# Patient Record
Sex: MALE | Race: BLACK OR AFRICAN AMERICAN | NOT HISPANIC OR LATINO | URBAN - METROPOLITAN AREA
[De-identification: names, ages, dates, MRNs, and addresses within clinical notes are randomized per-mention and may not be internally consistent; named-entity substitution may affect disease eponyms.]

---

## 2023-05-02 ENCOUNTER — OFFICE VISIT (OUTPATIENT)
Dept: INTERNAL MEDICINE | Facility: CLINIC | Age: 53
End: 2023-05-02
Payer: COMMERCIAL

## 2023-05-02 VITALS
DIASTOLIC BLOOD PRESSURE: 80 MMHG | RESPIRATION RATE: 16 BRPM | HEIGHT: 69 IN | TEMPERATURE: 97.8 F | BODY MASS INDEX: 38.51 KG/M2 | SYSTOLIC BLOOD PRESSURE: 142 MMHG | HEART RATE: 108 BPM | OXYGEN SATURATION: 98 % | WEIGHT: 260 LBS

## 2023-05-02 DIAGNOSIS — I10 HYPERTENSION, UNSPECIFIED TYPE: ICD-10-CM

## 2023-05-02 DIAGNOSIS — M79.89 LEG SWELLING: ICD-10-CM

## 2023-05-02 DIAGNOSIS — E11.9 TYPE 2 DIABETES MELLITUS WITHOUT COMPLICATION, WITHOUT LONG-TERM CURRENT USE OF INSULIN: ICD-10-CM

## 2023-05-02 DIAGNOSIS — R35.0 FREQUENCY OF URINATION: ICD-10-CM

## 2023-05-02 DIAGNOSIS — Z76.89 ENCOUNTER TO ESTABLISH CARE: Primary | ICD-10-CM

## 2023-05-02 LAB
BILIRUB BLD-MCNC: NEGATIVE MG/DL
CLARITY, POC: ABNORMAL
COLOR UR: YELLOW
EXPIRATION DATE: ABNORMAL
GLUCOSE UR STRIP-MCNC: NEGATIVE MG/DL
KETONES UR QL: NEGATIVE
LEUKOCYTE EST, POC: NEGATIVE
Lab: ABNORMAL
NITRITE UR-MCNC: NEGATIVE MG/ML
PH UR: 6 [PH] (ref 5–8)
PROT UR STRIP-MCNC: NEGATIVE MG/DL
RBC # UR STRIP: NEGATIVE /UL
SP GR UR: 1.02 (ref 1–1.03)
UROBILINOGEN UR QL: ABNORMAL

## 2023-05-02 RX ORDER — EMPAGLIFLOZIN 25 MG/1
TABLET, FILM COATED ORAL
COMMUNITY
Start: 2023-04-25

## 2023-05-02 RX ORDER — POTASSIUM CHLORIDE 750 MG/1
10 TABLET, EXTENDED RELEASE ORAL 2 TIMES DAILY
COMMUNITY

## 2023-05-02 RX ORDER — NAPROXEN 500 MG/1
TABLET ORAL
COMMUNITY
Start: 2023-04-26

## 2023-05-02 RX ORDER — CETIRIZINE HYDROCHLORIDE 10 MG/1
10 TABLET ORAL DAILY
COMMUNITY

## 2023-05-02 RX ORDER — FUROSEMIDE 20 MG/1
20 TABLET ORAL DAILY
COMMUNITY

## 2023-05-02 RX ORDER — GLIPIZIDE 10 MG/1
TABLET ORAL
COMMUNITY
Start: 2023-04-27

## 2023-05-02 RX ORDER — PREGABALIN 150 MG/1
CAPSULE ORAL
COMMUNITY
Start: 2023-04-24

## 2023-05-02 RX ORDER — MULTIPLE VITAMINS W/ MINERALS TAB 9MG-400MCG
1 TAB ORAL DAILY
COMMUNITY

## 2023-05-02 RX ORDER — ROSUVASTATIN CALCIUM 40 MG/1
40 TABLET, COATED ORAL DAILY
COMMUNITY

## 2023-05-02 NOTE — PROGRESS NOTES
Office Note     Name: Lincoln Alexander    : 1970     MRN: [id number]     Chief Complaint  Establish Care, Foot Swelling, and Diabetes    Subjective     History of Present Illness:  Lincoln Alexander is a 53 y.o. male who presents today to establish care with a new provider.  Medical history and current home medications reviewed with the patient.  Patient did see Dr. Ravi back in .  [facility].  [facility] ago.  He does have bilateral lower extremity edema.  [facility] and told to stay off of his feet.  He does try to wear compression socks when he is able.  He does have an e-stim which she uses about 20 minutes/day to his lower extremities.  He reports the edema has been worse over the past 2 weeks.  He was diagnosed with diabetes in .  His A1c was 7.0 a few weeks ago.  He is currently on 3 oral agents for diabetic control.  He was diagnosed with hypertension in  as well.  He also has sleep apnea and is [facility].  He has been diagnosed with peripheral neuropathy associated with his diabetes.  He was a previous cigarette smoker.  He uses alcohol rarely.  He denies any recreational drug use.  He denies any known CHF.  He did note that the swelling increased when his Lyrica was increased from 100 mg to 150 mg.  He denies further complaints or concerns at this time.  He presents today to establish care with a new provider and to discuss the above complaints.    Review of Systems   Constitutional: Negative.    HENT: Negative.    Respiratory: Negative.    Cardiovascular: Positive for leg swelling.   Gastrointestinal: Negative.    Genitourinary: Negative.    Musculoskeletal: Negative.    Skin: Negative.    Neurological: Negative.    Psychiatric/Behavioral: Negative.        Past Medical History:   Diagnosis Date   • Chronic back pain    • Diabetes mellitus    • GERD  (gastroesophageal reflux disease)    • Hyperlipidemia        Past Surgical History:   Procedure Laterality Date   • NO PAST SURGERIES     • NO PAST SURGERIES         Social History     Socioeconomic History   • Marital status:    Tobacco Use   • Smoking status: Former     Years: 23.00     Types: Cigarettes     Quit date: 2010     Years since quittin.2   Substance and Sexual Activity   • Alcohol use: Yes     Comment: 2 X YR   • Drug use: No   • Sexual activity: Yes     Partners: Female         Current Outpatient Medications:   •  cetirizine (zyrTEC) 10 MG tablet, Take 1 tablet by mouth Daily., Disp: , Rfl:   •  cholecalciferol (VITAMIN D3) 1000 UNITS tablet, Take 1 tablet by mouth Daily., Disp: , Rfl:   •  fluticasone (FLONASE) 50 MCG/ACT nasal spray, , Disp: , Rfl:   •  furosemide (LASIX) 20 MG tablet, Take 1 tablet by mouth Daily., Disp: , Rfl:   •  glipizide (GLUCOTROL) 10 MG tablet, , Disp: , Rfl:   •  Jardiance 25 MG tablet tablet, , Disp: , Rfl:   •  Losartan Potassium-HCTZ (HYZAAR PO), losartan-hydrochlorothiazide, Disp: , Rfl:   •  metFORMIN (GLUCOPHAGE) 1000 MG tablet, Take 1 tablet by mouth 2 (Two) Times a Day With Meals., Disp: 60 tablet, Rfl: 3  •  minocycline (MINOCIN,DYNACIN) 100 MG capsule, Take 1 capsule by mouth 2 (Two) Times a Day., Disp: , Rfl:   •  multivitamin with minerals tablet tablet, Take 1 tablet by mouth Daily., Disp: , Rfl:   •  naproxen (NAPROSYN) 500 MG tablet, , Disp: , Rfl:   •  omeprazole (PriLOSEC) 20 MG capsule, Take 1 capsule by mouth Daily., Disp: , Rfl:   •  potassium chloride (K-DUR,KLOR-CON) 10 MEQ CR tablet, Take 1 tablet by mouth 2 (Two) Times a Day., Disp: , Rfl:   •  pregabalin (LYRICA) 150 MG capsule, , Disp: , Rfl:   •  rosuvastatin (CRESTOR) 40 MG tablet, Take 1 tablet by mouth Daily., Disp: , Rfl:   •  sildenafil (VIAGRA) 100 MG tablet, Take 1 tablet by mouth Daily As Needed for Erectile Dysfunction., Disp: , Rfl:   •  terazosin (HYTRIN) 5 MG capsule,  "Take 1 capsule by mouth Every Night., Disp: , Rfl:   •  traMADol (ULTRAM) 50 MG tablet, , Disp: , Rfl:     Objective     Vital Signs  /80   Pulse 108   Temp 97.8 °F (36.6 °C)   Resp 16   Ht 175.3 cm (69\")   Wt 118 kg (260 lb)   SpO2 98%   BMI 38.40 kg/m²   Estimated body mass index is 38.4 kg/m² as calculated from the following:    Height as of this encounter: 175.3 cm (69\").    Weight as of this encounter: 118 kg (260 lb).    Class 2 Severe Obesity (BMI >=35 and <=39.9). Obesity-related health conditions include the following: Not addressed at this visit. Obesity is Not addressed at this visit. BMI is Not addressed at this visit. We discussed Not addressed at this visit.      Physical Exam  Constitutional:       Appearance: Normal appearance.   HENT:      Head: Normocephalic and atraumatic.      Nose: Nose normal.   Eyes:      Extraocular Movements: Extraocular movements intact.      Conjunctiva/sclera: Conjunctivae normal.      Pupils: Pupils are equal, round, and reactive to light.   Cardiovascular:      Rate and Rhythm: Normal rate and regular rhythm.   Pulmonary:      Effort: Pulmonary effort is normal. No respiratory distress.      Breath sounds: Normal breath sounds.   Musculoskeletal:         General: Normal range of motion.      Cervical back: Normal range of motion and neck supple.   Skin:     General: Skin is warm and dry.   Neurological:      General: No focal deficit present.      Mental Status: He is alert and oriented to person, place, and time. Mental status is at baseline.   Psychiatric:         Mood and Affect: Mood normal.         Behavior: Behavior normal.         Thought Content: Thought content normal.         Judgment: Judgment normal.          Assessment and Plan     Diagnoses and all orders for this visit:    1. Encounter to establish care (Primary)    2. Frequency of urination  -     POCT urinalysis dipstick, automated    3. Leg swelling    4. Hypertension, unspecified " type    5. Type 2 diabetes mellitus without complication, without long-term current use of insulin    Plan:  Patient presents to establish care with a new provider.  [facility]  We will try to obtain these lab records.  UA in the office today bland, no proteinuria.  Low-sodium diet advised.  Wear compression stockings as able.  Elevate feet as able.  Return to clinic in 2 weeks for follow-up visit.  Return to clinic sooner if needed.  Go to ED for further evaluation if any symptom worsens.    Follow Up  Return in about 2 weeks (around 5/16/2023).    FROYLAN Ruano    Part of this note may be an electronic transcription/translation of spoken language to printed text using the Dragon Dictation System.

## 2023-05-17 ENCOUNTER — OFFICE VISIT (OUTPATIENT)
Dept: INTERNAL MEDICINE | Facility: CLINIC | Age: 53
End: 2023-05-17
Payer: COMMERCIAL

## 2023-05-17 VITALS
SYSTOLIC BLOOD PRESSURE: 142 MMHG | OXYGEN SATURATION: 99 % | TEMPERATURE: 97.8 F | WEIGHT: 263 LBS | HEIGHT: 69 IN | DIASTOLIC BLOOD PRESSURE: 84 MMHG | BODY MASS INDEX: 38.95 KG/M2 | HEART RATE: 70 BPM

## 2023-05-17 DIAGNOSIS — Z09 FOLLOW-UP EXAM: Primary | ICD-10-CM

## 2023-05-17 DIAGNOSIS — K21.9 GASTROESOPHAGEAL REFLUX DISEASE, UNSPECIFIED WHETHER ESOPHAGITIS PRESENT: ICD-10-CM

## 2023-05-17 DIAGNOSIS — E78.5 HYPERLIPIDEMIA, UNSPECIFIED HYPERLIPIDEMIA TYPE: ICD-10-CM

## 2023-05-17 DIAGNOSIS — R60.0 BILATERAL LEG EDEMA: ICD-10-CM

## 2023-05-17 DIAGNOSIS — I10 HYPERTENSION, UNSPECIFIED TYPE: ICD-10-CM

## 2023-05-17 DIAGNOSIS — E11.9 TYPE 2 DIABETES MELLITUS WITHOUT COMPLICATION, WITHOUT LONG-TERM CURRENT USE OF INSULIN: ICD-10-CM

## 2023-05-17 LAB
EXPIRATION DATE: NORMAL
HBA1C MFR BLD: 8.3 %
Lab: NORMAL

## 2023-05-17 RX ORDER — BUMETANIDE 1 MG/1
TABLET ORAL
Qty: 108 TABLET | OUTPATIENT
Start: 2023-05-17

## 2023-05-17 RX ORDER — BUMETANIDE 1 MG/1
1 TABLET ORAL DAILY
Qty: 35 TABLET | Refills: 1 | Status: SHIPPED | OUTPATIENT
Start: 2023-05-17

## 2023-05-17 NOTE — PROGRESS NOTES
Office Note     Name: Lincoln Alexander    : 1970     MRN: [id number]     Chief Complaint  Diabetes, Foot Swelling (Feet still swelling. Has been taking the medication as prescribed. Would discuss trying a new medication. ), and Foot Pain (The swelling is also causing pain at the bottoms of his feet and tips of toes. )    Subjective     History of Present Illness:  Lincoln Alexander is a 53 y.o. male who presents today for a follow-up visit on diabetes and lower extremity swelling.  Patient complains of ongoing swelling to bilateral lower extremities.  He had been taking Lasix 20 mg daily.  That dose was increased to twice a day for the past 2 weeks with no improvement in lower extremity edema.  No known history of heart failure.  [facility] to [facility] pain.  He also has peripheral neuropathy.  He does complain of pain to the pads of his feet down to his toes.  He does have intermittent sharp pain in his feet but a continuous numbness and pain that occasionally feels warm to touch.  Patient's previous hemoglobin A1c was 7%.  Blood pressure is borderline controlled in the office today.  Patient does have a stim machine that he has been using to assist with lower extremity circulation.  He denies any new complaints or concerns today.      Past Medical History:   Diagnosis Date   • Chronic back pain    • Diabetes mellitus    • GERD (gastroesophageal reflux disease)    • Hyperlipidemia        Past Surgical History:   Procedure Laterality Date   • NO PAST SURGERIES     • NO PAST SURGERIES         Social History     Socioeconomic History   • Marital status:    Tobacco Use   • Smoking status: Former     Packs/day: 0.00     Years: 23.00     Pack years: 0.00     Types: Cigarettes     Quit date: 2010     Years since quittin.2   • Smokeless tobacco: Never   Substance and Sexual Activity   • Alcohol use: Yes     Comment: 2 X YR   • Drug use: No   • Sexual  "activity: Yes     Partners: Female         Current Outpatient Medications:   •  cetirizine (zyrTEC) 10 MG tablet, Take 1 tablet by mouth Daily., Disp: , Rfl:   •  cholecalciferol (VITAMIN D3) 1000 UNITS tablet, Take 1 tablet by mouth Daily., Disp: , Rfl:   •  fluticasone (FLONASE) 50 MCG/ACT nasal spray, , Disp: , Rfl:   •  glipizide (GLUCOTROL) 10 MG tablet, , Disp: , Rfl:   •  Jardiance 25 MG tablet tablet, , Disp: , Rfl:   •  Losartan Potassium-HCTZ (HYZAAR PO), losartan-hydrochlorothiazide, Disp: , Rfl:   •  metFORMIN (GLUCOPHAGE) 1000 MG tablet, Take 1 tablet by mouth 2 (Two) Times a Day With Meals., Disp: 60 tablet, Rfl: 3  •  minocycline (MINOCIN,DYNACIN) 100 MG capsule, Take 1 capsule by mouth 2 (Two) Times a Day., Disp: , Rfl:   •  multivitamin with minerals tablet tablet, Take 1 tablet by mouth Daily., Disp: , Rfl:   •  naproxen (NAPROSYN) 500 MG tablet, , Disp: , Rfl:   •  omeprazole (PriLOSEC) 20 MG capsule, Take 1 capsule by mouth Daily., Disp: , Rfl:   •  potassium chloride (K-DUR,KLOR-CON) 10 MEQ CR tablet, Take 1 tablet by mouth 2 (Two) Times a Day., Disp: , Rfl:   •  pregabalin (LYRICA) 150 MG capsule, , Disp: , Rfl:   •  rosuvastatin (CRESTOR) 40 MG tablet, Take 1 tablet by mouth Daily., Disp: , Rfl:   •  sildenafil (VIAGRA) 100 MG tablet, Take 1 tablet by mouth Daily As Needed for Erectile Dysfunction., Disp: , Rfl:   •  terazosin (HYTRIN) 5 MG capsule, Take 1 capsule by mouth Every Night., Disp: , Rfl:   •  traMADol (ULTRAM) 50 MG tablet, , Disp: , Rfl:   •  bumetanide (Bumex) 1 MG tablet, Take 1 tablet by mouth Daily. Take 1mg twice daily for 3 days then reduce back to 1mg daily., Disp: 35 tablet, Rfl: 1    Objective     Vital Signs  /84   Pulse 70   Temp 97.8 °F (36.6 °C)   Ht 175.3 cm (69.02\")   Wt 119 kg (263 lb)   SpO2 99%   BMI 38.82 kg/m²   Estimated body mass index is 38.82 kg/m² as calculated from the following:    Height as of this encounter: 175.3 cm (69.02\").    Weight as " of this encounter: 119 kg (263 lb).    Class 2 Severe Obesity (BMI >=35 and <=39.9). Obesity-related health conditions include the following: hypertension, diabetes mellitus, dyslipidemias, GERD and osteoarthritis. Obesity is unchanged. BMI is is above average; BMI management plan is completed. We discussed portion control and increasing exercise.      Physical Exam  Constitutional:       Appearance: Normal appearance.   HENT:      Head: Normocephalic and atraumatic.      Nose: Nose normal.   Eyes:      Extraocular Movements: Extraocular movements intact.      Conjunctiva/sclera: Conjunctivae normal.      Pupils: Pupils are equal, round, and reactive to light.      Comments: Glasses in place   Cardiovascular:      Rate and Rhythm: Normal rate.   Pulmonary:      Effort: Pulmonary effort is normal. No respiratory distress.   Musculoskeletal:         General: Normal range of motion.      Cervical back: Normal range of motion and neck supple.      Right lower leg: Edema present.      Left lower leg: Edema present.   Skin:     General: Skin is warm and dry.   Neurological:      General: No focal deficit present.      Mental Status: He is alert and oriented to person, place, and time. Mental status is at baseline.   Psychiatric:         Mood and Affect: Mood normal.         Behavior: Behavior normal.         Thought Content: Thought content normal.         Judgment: Judgment normal.          Assessment and Plan     Diagnoses and all orders for this visit:    1. Follow-up exam (Primary)    2. Type 2 diabetes mellitus without complication, without long-term current use of insulin  -     POC Glycosylated Hemoglobin (Hb A1C)    3. Bilateral leg edema  -     Basic Metabolic Panel; Future  -     Urinalysis With Microscopic - Urine, Clean Catch; Future  -     bumetanide (Bumex) 1 MG tablet; Take 1 tablet by mouth Daily. Take 1mg twice daily for 3 days then reduce back to 1mg daily.  Dispense: 35 tablet; Refill: 1    4.  Hypertension, unspecified type    5. Gastroesophageal reflux disease, unspecified whether esophagitis present    6. Hyperlipidemia, unspecified hyperlipidemia type    Plan:  Hemoglobin A1c elevated in the office today at 8.3%.  Advised patient to work on better glycemic control.  A1c goal less than 7%.  Stop Lasix.  Will start Bumex 1 mg daily.  We will have patient take 1 mg twice daily for 3 days and then reduce to once a day.  Continue to work on low-sodium diet.  Will check BMP and UA in 2 weeks.  Further plan of care based on lab results and patient response to Bumex.  Will consider doing an echo to evaluate for heart failure.  Return to clinic in 1 month.  Return to clinic sooner if needed.  Go to ED for further evaluation if any symptom worsens.    Follow Up  Return in about 1 month (around 6/17/2023).    FROYLAN Ruano    Part of this note may be an electronic transcription/translation of spoken language to printed text using the Dragon Dictation System.

## 2023-07-11 ENCOUNTER — OFFICE VISIT (OUTPATIENT)
Dept: INTERNAL MEDICINE | Facility: CLINIC | Age: 53
End: 2023-07-11
Payer: COMMERCIAL

## 2023-07-11 VITALS
SYSTOLIC BLOOD PRESSURE: 124 MMHG | HEART RATE: 68 BPM | TEMPERATURE: 96.5 F | DIASTOLIC BLOOD PRESSURE: 82 MMHG | WEIGHT: 252 LBS | OXYGEN SATURATION: 99 % | HEIGHT: 69 IN | BODY MASS INDEX: 37.33 KG/M2

## 2023-07-11 DIAGNOSIS — R06.09 DOE (DYSPNEA ON EXERTION): ICD-10-CM

## 2023-07-11 DIAGNOSIS — Z09 FOLLOW-UP EXAM: Primary | ICD-10-CM

## 2023-07-11 DIAGNOSIS — I10 HYPERTENSION, UNSPECIFIED TYPE: ICD-10-CM

## 2023-07-11 DIAGNOSIS — E11.9 TYPE 2 DIABETES MELLITUS WITHOUT COMPLICATION, WITHOUT LONG-TERM CURRENT USE OF INSULIN: ICD-10-CM

## 2023-07-11 DIAGNOSIS — R42 LIGHTHEADEDNESS: ICD-10-CM

## 2023-07-11 DIAGNOSIS — R79.89 ELEVATED SERUM CREATININE: ICD-10-CM

## 2023-07-11 DIAGNOSIS — R60.0 BILATERAL LEG EDEMA: ICD-10-CM

## 2023-07-11 PROCEDURE — 36415 COLL VENOUS BLD VENIPUNCTURE: CPT | Performed by: NURSE PRACTITIONER

## 2023-07-12 LAB
ALBUMIN SERPL-MCNC: 4.4 G/DL (ref 3.5–5.2)
ALBUMIN/GLOB SERPL: 1.6 G/DL
ALP SERPL-CCNC: 54 U/L (ref 39–117)
ALT SERPL W P-5'-P-CCNC: 32 U/L (ref 1–41)
ANION GAP SERPL CALCULATED.3IONS-SCNC: 14 MMOL/L (ref 5–15)
AST SERPL-CCNC: 27 U/L (ref 1–40)
BILIRUB SERPL-MCNC: 0.7 MG/DL (ref 0–1.2)
BUN SERPL-MCNC: 24 MG/DL (ref 6–20)
BUN/CREAT SERPL: 18.5 (ref 7–25)
CALCIUM SPEC-SCNC: 11 MG/DL (ref 8.6–10.5)
CHLORIDE SERPL-SCNC: 92 MMOL/L (ref 98–107)
CO2 SERPL-SCNC: 31 MMOL/L (ref 22–29)
CREAT SERPL-MCNC: 1.3 MG/DL (ref 0.76–1.27)
EGFRCR SERPLBLD CKD-EPI 2021: 65.7 ML/MIN/1.73
GLOBULIN UR ELPH-MCNC: 2.8 GM/DL
GLUCOSE SERPL-MCNC: 217 MG/DL (ref 65–99)
POTASSIUM SERPL-SCNC: 4 MMOL/L (ref 3.5–5.2)
PROT SERPL-MCNC: 7.2 G/DL (ref 6–8.5)
SODIUM SERPL-SCNC: 137 MMOL/L (ref 136–145)

## 2023-07-21 ENCOUNTER — TELEPHONE (OUTPATIENT)
Dept: INTERNAL MEDICINE | Facility: CLINIC | Age: 53
End: 2023-07-21

## 2023-07-21 NOTE — TELEPHONE ENCOUNTER
Caller: Lincoln Alexander    Relationship: Self    Best call back number: [phone]     What medications are you currently taking:   Current Outpatient Medications on File Prior to Visit   Medication Sig Dispense Refill    bumetanide (Bumex) 1 MG tablet Take 1 tablet by mouth Daily. Take 1mg twice daily for 3 days then reduce back to 1mg daily. 30 tablet 1    cetirizine (zyrTEC) 10 MG tablet Take 1 tablet by mouth Daily.      cholecalciferol (VITAMIN D3) 1000 UNITS tablet Take 1 tablet by mouth Daily.      fluticasone (FLONASE) 50 MCG/ACT nasal spray       glipizide (GLUCOTROL) 10 MG tablet       Jardiance 25 MG tablet tablet       Losartan Potassium-HCTZ (HYZAAR PO) losartan-hydrochlorothiazide      metFORMIN (GLUCOPHAGE) 1000 MG tablet Take 1 tablet by mouth 2 (Two) Times a Day With Meals. 60 tablet 3    minocycline (MINOCIN,DYNACIN) 100 MG capsule Take 1 capsule by mouth 2 (Two) Times a Day.      multivitamin with minerals tablet tablet Take 1 tablet by mouth Daily.      naproxen (NAPROSYN) 500 MG tablet       omeprazole (PriLOSEC) 20 MG capsule Take 1 capsule by mouth Daily.      potassium chloride (K-DUR,KLOR-CON) 10 MEQ CR tablet Take 1 tablet by mouth 2 (Two) Times a Day.      pregabalin (LYRICA) 150 MG capsule       rosuvastatin (CRESTOR) 40 MG tablet Take 1 tablet by mouth Daily.      sildenafil (VIAGRA) 100 MG tablet Take 1 tablet by mouth Daily As Needed for Erectile Dysfunction.      terazosin (HYTRIN) 5 MG capsule Take 1 capsule by mouth Every Night.      traMADol (ULTRAM) 50 MG tablet        No current facility-administered medications on file prior to visit.        When did you start taking these medications: HAS BEEN ON THIS MEDICATION FOR A WHILE    Which medication are you concerned about: BUMETANIDE (BUMEX) 1MG TABLET    Who prescribed you this medication: CORTEZ MELÉNDEZ    What are your concerns: PATIENT STATED THAT THERE WERE NO ISSUES WITH THIS MEDICATION UNTIL RECENTLY.     HE STATED IN THE LAST  WEEK HIS RIGHT LEG HAS BEEN EXTREMELY ITCHY. HE HAS TRIED ANTI ITCH CREAM TO NO AVAIL AND READ THAT ONE OF THE SIDE EFFECTS OF THIS MEDICATION IS ITCHING. THIS ITCHINESS IS STARTING TO CAUSE ISSUES WITH SLEEPING.    HE IS REQUESTING TO CEASE THIS MEDICATION.    PLEASE ADVISE

## 2023-07-25 PROBLEM — R79.89 ELEVATED SERUM CREATININE: Status: ACTIVE | Noted: 2023-07-25

## 2023-07-25 PROBLEM — R06.09 DOE (DYSPNEA ON EXERTION): Status: ACTIVE | Noted: 2023-07-25

## 2023-07-25 PROBLEM — R42 LIGHTHEADEDNESS: Status: ACTIVE | Noted: 2023-07-25

## 2023-07-25 NOTE — TELEPHONE ENCOUNTER
Patient would like to try something different to help with the leg swelling. He has tried Lasix and Bumex. Please advise

## 2023-07-26 DIAGNOSIS — R60.0 BILATERAL LEG EDEMA: Primary | ICD-10-CM

## 2023-07-26 RX ORDER — CHLORTHALIDONE 25 MG/1
25 TABLET ORAL DAILY PRN
Qty: 30 TABLET | Refills: 1 | Status: SHIPPED | OUTPATIENT
Start: 2023-07-26

## 2023-07-26 NOTE — PROGRESS NOTES
Office Note     Name: Lincoln Alexander    : 1970     MRN: [id number]     Chief Complaint  Lab Follow Up, Foot Swelling (Does not think the bumex is working. Still swelling on top of his feet. Has previously tried lasix also did not help. Going on vacation next month will be on a 12 hour flight worried about swelling while on the trip), and Dizziness (Still feeling light headed. In between meals and if he stands up too fast. )    Subjective     History of Present Illness:  Lincoln Alexander is a 53 y.o. male who presents today for for a follow-up visit.  Patient continues to experience lower extremity edema despite daily Bumex use.  Patient is also here to follow-up on type 2 diabetes and hypertension.  Most recent hemoglobin A1c is worse at 10.8%.  A1c was 8.3% 2 months ago.  Patient is not strict with his diabetic diet.  Is on glipizide 10 mg daily, Jardiance 25 mg daily, and metformin 1000 mg twice daily.  Patient has been eating a lot of fruit recently.  He reports a high intake of bananas, cherries, apple, and fruit cups.  He is eating the prepackaged fruit cups and drinking the juice.  He is also drinking lots of apple and orange juice daily.  He also admits to eating a lot of Post[place] recently.  Blood pressures well controlled in the office today.  Patient continues to complain of lower extremity edema.  Patient has been taking Bumex 1 mg daily.  Patient has not seen any significant improvement in lower extremity edema.  Patient has not been elevating his legs much recently.  He does help to take care of his father-in-law who lives with him.  He also has been diagnosed with peripheral neuropathy.  He has not been exercising recently.  Diabetes remains uncontrolled.  He is concerned about the lower extremity edema as he is taking his family on a vacation in a few weeks and will be on a road trip in the car for an extended amount of time.  He also wants to know that there are intermittent mild episodes of  dizziness most often associated with position changes.  Denies any syncopal episodes.  Denies any chest pain or shortness of breath.  Denies further complaints or concerns today.      Past Medical History:   Diagnosis Date    Chronic back pain     Diabetes mellitus     GERD (gastroesophageal reflux disease)     Hyperlipidemia        Past Surgical History:   Procedure Laterality Date    NO PAST SURGERIES      NO PAST SURGERIES         Social History     Socioeconomic History    Marital status:    Tobacco Use    Smoking status: Former     Packs/day: 0.00     Years: 23.00     Pack years: 0.00     Types: Cigarettes     Quit date: 2010     Years since quittin.4    Smokeless tobacco: Never   Substance and Sexual Activity    Alcohol use: Yes     Comment: 2 X YR    Drug use: No    Sexual activity: Yes     Partners: Female         Current Outpatient Medications:     bumetanide (Bumex) 1 MG tablet, Take 1 tablet by mouth Daily. Take 1mg twice daily for 3 days then reduce back to 1mg daily., Disp: 30 tablet, Rfl: 1    cetirizine (zyrTEC) 10 MG tablet, Take 1 tablet by mouth Daily., Disp: , Rfl:     cholecalciferol (VITAMIN D3) 1000 UNITS tablet, Take 1 tablet by mouth Daily., Disp: , Rfl:     fluticasone (FLONASE) 50 MCG/ACT nasal spray, , Disp: , Rfl:     glipizide (GLUCOTROL) 10 MG tablet, , Disp: , Rfl:     Jardiance 25 MG tablet tablet, , Disp: , Rfl:     Losartan Potassium-HCTZ (HYZAAR PO), losartan-hydrochlorothiazide, Disp: , Rfl:     metFORMIN (GLUCOPHAGE) 1000 MG tablet, Take 1 tablet by mouth 2 (Two) Times a Day With Meals., Disp: 60 tablet, Rfl: 3    minocycline (MINOCIN,DYNACIN) 100 MG capsule, Take 1 capsule by mouth 2 (Two) Times a Day., Disp: , Rfl:     multivitamin with minerals tablet tablet, Take 1 tablet by mouth Daily., Disp: , Rfl:     naproxen (NAPROSYN) 500 MG tablet, , Disp: , Rfl:     omeprazole (PriLOSEC) 20 MG capsule, Take 1 capsule by mouth Daily., Disp: , Rfl:     potassium  "chloride (K-DUR,KLOR-CON) 10 MEQ CR tablet, Take 1 tablet by mouth 2 (Two) Times a Day., Disp: , Rfl:     pregabalin (LYRICA) 150 MG capsule, , Disp: , Rfl:     rosuvastatin (CRESTOR) 40 MG tablet, Take 1 tablet by mouth Daily., Disp: , Rfl:     sildenafil (VIAGRA) 100 MG tablet, Take 1 tablet by mouth Daily As Needed for Erectile Dysfunction., Disp: , Rfl:     terazosin (HYTRIN) 5 MG capsule, Take 1 capsule by mouth Every Night., Disp: , Rfl:     traMADol (ULTRAM) 50 MG tablet, , Disp: , Rfl:     Objective     Vital Signs  /82   Pulse 68   Temp 96.5 °F (35.8 °C)   Ht 175.3 cm (69.02\")   Wt 114 kg (252 lb)   SpO2 99%   BMI 37.20 kg/m²   Estimated body mass index is 37.2 kg/m² as calculated from the following:    Height as of this encounter: 175.3 cm (69.02\").    Weight as of this encounter: 114 kg (252 lb).           Physical Exam  Constitutional:       Appearance: Normal appearance.   HENT:      Head: Normocephalic and atraumatic.      Nose: Nose normal.   Eyes:      Extraocular Movements: Extraocular movements intact.      Conjunctiva/sclera: Conjunctivae normal.      Pupils: Pupils are equal, round, and reactive to light.   Cardiovascular:      Rate and Rhythm: Normal rate and regular rhythm.   Pulmonary:      Effort: Pulmonary effort is normal. No respiratory distress.      Breath sounds: Normal breath sounds.   Musculoskeletal:         General: Normal range of motion.      Cervical back: Normal range of motion and neck supple.      Right lower leg: Edema present.      Left lower leg: Edema present.   Skin:     General: Skin is warm and dry.   Neurological:      General: No focal deficit present.      Mental Status: He is alert and oriented to person, place, and time. Mental status is at baseline.   Psychiatric:         Mood and Affect: Mood normal.         Behavior: Behavior normal.         Thought Content: Thought content normal.         Judgment: Judgment normal.        Assessment and Plan "     Diagnoses and all orders for this visit:    1. Follow-up exam (Primary)    2. Bilateral leg edema  -     Adult Transthoracic Echo Complete W/ Cont if Necessary Per Protocol; Future    3. Hypertension, unspecified type    4. Type 2 diabetes mellitus without complication, without long-term current use of insulin    5. Elevated serum creatinine  -     Comprehensive metabolic panel    6. Lightheadedness  -     Adult Transthoracic Echo Complete W/ Cont if Necessary Per Protocol; Future    7. BOLAÑOS (dyspnea on exertion)  -     Adult Transthoracic Echo Complete W/ Cont if Necessary Per Protocol; Future    Plan:  Continue with Bumex as needed.  Orders placed for CMP.  Orders placed for echo.  Advised patient to continue to wear compression stockings.  Low-sodium diet.  Patient needs to work on tight glycemic control.  A1c goal less than 7.  Discussed with patient dietary modifications to improve glucose control.  Advised to reduce fruit and fruit juice intake.  May consider adding Rybelsus for better glycemic control if insurance approves.  Plan for follow-up in 3 months on chronic conditions.  Return to clinic sooner if needed.  Follow Up  Return in about 3 months (around 10/11/2023), or if symptoms worsen or fail to improve.    FROYLAN Ruano    Part of this note may be an electronic transcription/translation of spoken language to printed text using the Dragon Dictation System.

## 2023-08-31 ENCOUNTER — TELEPHONE (OUTPATIENT)
Dept: INTERNAL MEDICINE | Facility: CLINIC | Age: 53
End: 2023-08-31

## 2023-08-31 NOTE — TELEPHONE ENCOUNTER
Caller: Lincoln Alexander    Relationship: Self    Best call back number: [phone]       What medications are you currently taking:   Current Outpatient Medications on File Prior to Visit   Medication Sig Dispense Refill    cetirizine (zyrTEC) 10 MG tablet Take 1 tablet by mouth Daily.      chlorthalidone (HYGROTON) 25 MG tablet Take 1 tablet by mouth Daily As Needed (leg swelling). 30 tablet 1    cholecalciferol (VITAMIN D3) 1000 UNITS tablet Take 1 tablet by mouth Daily.      fluticasone (FLONASE) 50 MCG/ACT nasal spray       glipizide (GLUCOTROL) 10 MG tablet       Jardiance 25 MG tablet tablet       Losartan Potassium-HCTZ (HYZAAR PO) losartan-hydrochlorothiazide      metFORMIN (GLUCOPHAGE) 1000 MG tablet Take 1 tablet by mouth 2 (Two) Times a Day With Meals. 60 tablet 3    minocycline (MINOCIN,DYNACIN) 100 MG capsule Take 1 capsule by mouth 2 (Two) Times a Day.      multivitamin with minerals tablet tablet Take 1 tablet by mouth Daily.      naproxen (NAPROSYN) 500 MG tablet       omeprazole (PriLOSEC) 20 MG capsule Take 1 capsule by mouth Daily.      potassium chloride (K-DUR,KLOR-CON) 10 MEQ CR tablet Take 1 tablet by mouth 2 (Two) Times a Day.      pregabalin (LYRICA) 150 MG capsule       rosuvastatin (CRESTOR) 40 MG tablet Take 1 tablet by mouth Daily.      sildenafil (VIAGRA) 100 MG tablet Take 1 tablet by mouth Daily As Needed for Erectile Dysfunction.      terazosin (HYTRIN) 5 MG capsule Take 1 capsule by mouth Every Night.      traMADol (ULTRAM) 50 MG tablet        No current facility-administered medications on file prior to visit.      [id number] - [address]  AT [facility] - [phone]  - [phone]  [phone]     Which medication are you concerned about: PATIENT WAS UNSURE OF THE MEDICATION BUT IT WAS FOR SWELLING OF HIS FEET    Who prescribed you this medication: FROYLAN KERNS    What are your concerns: PATIENT IS  STILL EXPERIENCING SWELLING IN BOTH FEET BUT MORE IN THE RIGHT FOOT THAN THE LEFT. PATIENT STATES THE MEDICATION HIS PCP PLACED HIM ON IS NOT WORKING.

## 2023-09-06 DIAGNOSIS — R60.0 BILATERAL LEG EDEMA: Primary | ICD-10-CM

## 2023-09-07 NOTE — TELEPHONE ENCOUNTER
Patient called back into the office to let us know his feet are still swelling. The medications he was put on isn't really having an affect.   Phone: [phone]

## 2023-09-07 NOTE — TELEPHONE ENCOUNTER
Spoke with patient and let him know [facility] has ordered another test to try and figure out why he keeps swelling. I also gave him the number to central scheduling to reschedule the echo. He verbalized understanding.

## 2023-09-17 DIAGNOSIS — R60.0 BILATERAL LEG EDEMA: ICD-10-CM

## 2023-09-17 RX ORDER — CHLORTHALIDONE 25 MG/1
TABLET ORAL
Qty: 30 TABLET | Refills: 0 | Status: SHIPPED | OUTPATIENT
Start: 2023-09-17

## 2023-09-17 RX ORDER — CHLORTHALIDONE 25 MG/1
TABLET ORAL
Qty: 90 TABLET | OUTPATIENT
Start: 2023-09-17

## 2023-10-30 DIAGNOSIS — R60.0 BILATERAL LEG EDEMA: ICD-10-CM

## 2023-10-31 RX ORDER — CHLORTHALIDONE 25 MG/1
TABLET ORAL
Qty: 90 TABLET | Refills: 0 | Status: SHIPPED | OUTPATIENT
Start: 2023-10-31

## 2024-01-08 DIAGNOSIS — R60.0 BILATERAL LEG EDEMA: ICD-10-CM

## 2024-01-08 RX ORDER — CHLORTHALIDONE 25 MG/1
TABLET ORAL
Qty: 90 TABLET | Refills: 0 | OUTPATIENT
Start: 2024-01-08

## 2024-01-12 NOTE — TELEPHONE ENCOUNTER
Name: Lincoln Alexander    Relationship: Self    Best Callback Number: [phone]    HUB PROVIDED THE RELAY MESSAGE FROM THE OFFICE   PATIENT VOICED UNDERSTANDING AND HAS NO FURTHER QUESTIONS AT THIS TIME    ADDITIONAL INFORMATION:PATIENT MADE APPOINTMENT FOR 01/15/2024 AT 9:00 AM

## 2025-05-21 ENCOUNTER — TELEPHONE (OUTPATIENT)
Dept: INTERNAL MEDICINE | Facility: CLINIC | Age: 55
End: 2025-05-21
Payer: COMMERCIAL

## 2025-05-21 NOTE — TELEPHONE ENCOUNTER
"\"Hub to relay\":     patient has not been seen in office since 2023. In order to comply with the provider requirements, patient needs to schedule an annual exam.        PATIENT HUNG UP WHEN I CALLED TO GET SCHEDULED.  "